# Patient Record
Sex: FEMALE | Race: WHITE | NOT HISPANIC OR LATINO | Employment: OTHER | URBAN - METROPOLITAN AREA
[De-identification: names, ages, dates, MRNs, and addresses within clinical notes are randomized per-mention and may not be internally consistent; named-entity substitution may affect disease eponyms.]

---

## 2019-08-08 ENCOUNTER — NEW PATIENT COMPREHENSIVE (OUTPATIENT)
Dept: URBAN - METROPOLITAN AREA CLINIC 47 | Facility: CLINIC | Age: 64
End: 2019-08-08

## 2019-08-08 DIAGNOSIS — H52.13: ICD-10-CM

## 2019-08-08 DIAGNOSIS — H52.7: ICD-10-CM

## 2019-08-08 DIAGNOSIS — H25.12: ICD-10-CM

## 2019-08-08 DIAGNOSIS — H25.11: ICD-10-CM

## 2019-08-08 PROCEDURE — 92004 COMPRE OPH EXAM NEW PT 1/>: CPT

## 2019-08-08 PROCEDURE — 92015 DETERMINE REFRACTIVE STATE: CPT

## 2019-08-08 ASSESSMENT — VISUAL ACUITY
OS_CC: 20/30-2
OS_SC: 20/80+2
OD_SC: J1
OD_CC: 20/40+2
OS_CC: J2
OS_SC: J6
OD_BAT: 20/50
OD_SC: 20/70
OS_BAT: 20/30
OD_CC: J2

## 2019-08-08 ASSESSMENT — TONOMETRY
OD_IOP_MMHG: 14
OS_IOP_MMHG: 16

## 2019-09-18 NOTE — PATIENT DISCUSSION
K SICCA OU OU: USE GOOD QUALITY ARTIFICIAL TEARS 3-4 TIMES DAILY. INSTRUCTED PATIENT NOT TO USE VISINE OR CLEAR EYES. WILL CONTINUE TO FOLLOW. GENTEAL GEL QHS OU.

## 2019-10-03 NOTE — PATIENT DISCUSSION
1) VISION MUCH IMPROVED - WILL RE-REFRACT PATIENT HAS PREVIOUS REFRACTION MAY BE TOO STRONG NOW THAT SPK IS GONE

## 2019-10-03 NOTE — PATIENT DISCUSSION
DRY EYE SYNDROME OU: OVERALL MUCH IMPROVED. CONTINUE TO USE GOOD QUALITY ARTIFICIAL TEARS 3-4 TIMES DAILY. INSTRUCTED PATIENT NOT TO USE VISINE OR CLEAR EYES. WILL CONTINUE TO FOLLOW.

## 2022-07-22 ENCOUNTER — NEW PATIENT (OUTPATIENT)
Dept: URBAN - METROPOLITAN AREA CLINIC 36 | Facility: CLINIC | Age: 67
End: 2022-07-22

## 2022-07-22 DIAGNOSIS — H52.13: ICD-10-CM

## 2022-07-22 DIAGNOSIS — H25.13: ICD-10-CM

## 2022-07-22 PROCEDURE — 92015 DETERMINE REFRACTIVE STATE: CPT

## 2022-07-22 PROCEDURE — 92004 COMPRE OPH EXAM NEW PT 1/>: CPT

## 2023-01-25 ENCOUNTER — NEW PATIENT (OUTPATIENT)
Dept: URBAN - METROPOLITAN AREA CLINIC 36 | Facility: CLINIC | Age: 68
End: 2023-01-25

## 2023-01-25 DIAGNOSIS — H35.62: ICD-10-CM

## 2023-01-25 DIAGNOSIS — H18.593: ICD-10-CM

## 2023-01-25 DIAGNOSIS — D31.31: ICD-10-CM

## 2023-01-25 DIAGNOSIS — H25.813: ICD-10-CM

## 2023-01-25 DIAGNOSIS — H52.7: ICD-10-CM

## 2023-01-25 PROCEDURE — 92015 DETERMINE REFRACTIVE STATE: CPT

## 2023-01-25 PROCEDURE — 99199RRD RESIDENT RENDERING PROVIDER

## 2023-01-25 PROCEDURE — 92004 COMPRE OPH EXAM NEW PT 1/>: CPT

## 2023-01-25 ASSESSMENT — VISUAL ACUITY
OS_SC: 20/40
OD_CC: J1
OU_CC: 20/20
OS_SC: J2
OS_CC: 20/25
OD_SC: 20/40
OU_CC: J1
OS_CC: J1
OU_SC: J2
OU_SC: 20/40+2
OD_SC: J2
OD_CC: 20/25

## 2023-01-25 ASSESSMENT — TONOMETRY
OD_IOP_MMHG: 16
OS_IOP_MMHG: 16

## 2025-05-02 ENCOUNTER — COMPREHENSIVE EXAM (OUTPATIENT)
Age: 70
End: 2025-05-02

## 2025-05-02 DIAGNOSIS — H52.7: ICD-10-CM

## 2025-05-02 DIAGNOSIS — D31.31: ICD-10-CM

## 2025-05-02 DIAGNOSIS — H25.813: ICD-10-CM

## 2025-05-02 DIAGNOSIS — H18.593: ICD-10-CM

## 2025-05-02 PROCEDURE — 92014 COMPRE OPH EXAM EST PT 1/>: CPT

## 2025-05-02 PROCEDURE — 92015 DETERMINE REFRACTIVE STATE: CPT
